# Patient Record
Sex: FEMALE | URBAN - METROPOLITAN AREA
[De-identification: names, ages, dates, MRNs, and addresses within clinical notes are randomized per-mention and may not be internally consistent; named-entity substitution may affect disease eponyms.]

---

## 2017-08-07 ENCOUNTER — NEW PATIENT (OUTPATIENT)
Dept: URBAN - METROPOLITAN AREA CLINIC 27 | Facility: CLINIC | Age: 62
End: 2017-08-07

## 2017-08-07 DIAGNOSIS — H31.103: ICD-10-CM

## 2017-08-07 PROCEDURE — 92225 OPHTHALMOSCOPY (INITIAL): CPT

## 2017-08-07 PROCEDURE — 99243 OFF/OP CNSLTJ NEW/EST LOW 30: CPT

## 2017-08-07 ASSESSMENT — VISUAL ACUITY
OS_SC: 20/20
OD_SC: 20/20

## 2017-08-07 ASSESSMENT — TONOMETRY
OS_IOP_MMHG: 17
OD_IOP_MMHG: 17

## 2021-04-06 DIAGNOSIS — Z23 ENCOUNTER FOR IMMUNIZATION: ICD-10-CM

## 2021-11-14 ENCOUNTER — HOSPITAL ENCOUNTER (EMERGENCY)
Facility: HOSPITAL | Age: 66
Discharge: HOME | End: 2021-11-14
Attending: EMERGENCY MEDICINE | Admitting: EMERGENCY MEDICINE
Payer: COMMERCIAL

## 2021-11-14 ENCOUNTER — APPOINTMENT (EMERGENCY)
Dept: RADIOLOGY | Facility: HOSPITAL | Age: 66
End: 2021-11-14
Payer: COMMERCIAL

## 2021-11-14 VITALS
DIASTOLIC BLOOD PRESSURE: 79 MMHG | OXYGEN SATURATION: 100 % | HEART RATE: 91 BPM | TEMPERATURE: 97.8 F | BODY MASS INDEX: 29.16 KG/M2 | SYSTOLIC BLOOD PRESSURE: 127 MMHG | WEIGHT: 175 LBS | RESPIRATION RATE: 20 BRPM | HEIGHT: 65 IN

## 2021-11-14 DIAGNOSIS — M54.9 BACK PAIN: Primary | ICD-10-CM

## 2021-11-14 DIAGNOSIS — S20.219A RIB CONTUSION: ICD-10-CM

## 2021-11-14 PROCEDURE — 72100 X-RAY EXAM L-S SPINE 2/3 VWS: CPT

## 2021-11-14 PROCEDURE — 71101 X-RAY EXAM UNILAT RIBS/CHEST: CPT

## 2021-11-14 PROCEDURE — 72072 X-RAY EXAM THORAC SPINE 3VWS: CPT

## 2021-11-14 PROCEDURE — 99284 EMERGENCY DEPT VISIT MOD MDM: CPT

## 2021-11-14 PROCEDURE — 96374 THER/PROPH/DIAG INJ IV PUSH: CPT

## 2021-11-14 PROCEDURE — 99285 EMERGENCY DEPT VISIT HI MDM: CPT | Performed by: EMERGENCY MEDICINE

## 2021-11-14 RX ORDER — LIDOCAINE 50 MG/G
1 PATCH TOPICAL DAILY
Qty: 2 PATCH | Refills: 0 | Status: SHIPPED | OUTPATIENT
Start: 2021-11-14 | End: 2021-11-16

## 2021-11-14 RX ORDER — CYCLOBENZAPRINE HCL 10 MG
10 TABLET ORAL 2 TIMES DAILY PRN
Qty: 10 TABLET | Refills: 0 | Status: SHIPPED | OUTPATIENT
Start: 2021-11-14 | End: 2021-11-19

## 2021-11-14 RX ORDER — HYDROMORPHONE HCL/PF 1 MG/ML
1 SYRINGE (ML) INJECTION ONCE
Status: COMPLETED | OUTPATIENT
Start: 2021-11-14 | End: 2021-11-14

## 2021-11-14 RX ORDER — PREDNISONE 20 MG/1
60 TABLET ORAL DAILY
Qty: 15 TABLET | Refills: 0 | Status: SHIPPED | OUTPATIENT
Start: 2021-11-14 | End: 2021-11-19

## 2021-11-14 RX ADMIN — HYDROMORPHONE HYDROCHLORIDE 1 MG: 1 INJECTION, SOLUTION INTRAMUSCULAR; INTRAVENOUS; SUBCUTANEOUS at 08:36

## 2025-02-19 ENCOUNTER — APPOINTMENT (OUTPATIENT)
Dept: RADIOLOGY | Facility: HOSPITAL | Age: 70
End: 2025-02-19
Payer: COMMERCIAL

## 2025-02-19 ENCOUNTER — HOSPITAL ENCOUNTER (EMERGENCY)
Facility: HOSPITAL | Age: 70
Discharge: LEFT BEFORE DISCHARGE | End: 2025-02-19
Attending: STUDENT IN AN ORGANIZED HEALTH CARE EDUCATION/TRAINING PROGRAM | Admitting: STUDENT IN AN ORGANIZED HEALTH CARE EDUCATION/TRAINING PROGRAM
Payer: COMMERCIAL

## 2025-02-19 ENCOUNTER — APPOINTMENT (EMERGENCY)
Dept: RADIOLOGY | Facility: HOSPITAL | Age: 70
End: 2025-02-19
Payer: COMMERCIAL

## 2025-02-19 VITALS
WEIGHT: 171 LBS | OXYGEN SATURATION: 99 % | RESPIRATION RATE: 18 BRPM | TEMPERATURE: 97.5 F | HEART RATE: 115 BPM | DIASTOLIC BLOOD PRESSURE: 83 MMHG | HEIGHT: 65 IN | SYSTOLIC BLOOD PRESSURE: 160 MMHG | BODY MASS INDEX: 28.49 KG/M2

## 2025-02-19 DIAGNOSIS — M25.532 LEFT WRIST PAIN: ICD-10-CM
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
## 2025-02-19 PROCEDURE — 99284 EMERGENCY DEPT VISIT MOD MDM: CPT

## 2025-02-19 PROCEDURE — 99284 EMERGENCY DEPT VISIT MOD MDM: CPT | Performed by: STUDENT IN AN ORGANIZED HEALTH CARE EDUCATION/TRAINING PROGRAM

## 2025-02-19 PROCEDURE — 73110 X-RAY EXAM OF WRIST: CPT

## 2025-02-19 PROCEDURE — 29125 APPL SHORT ARM SPLINT STATIC: CPT | Performed by: STUDENT IN AN ORGANIZED HEALTH CARE EDUCATION/TRAINING PROGRAM

## 2025-02-19 RX ORDER — ACETAMINOPHEN 325 MG/1
650 TABLET ORAL ONCE
Status: COMPLETED | OUTPATIENT
Start: 2025-02-19 | End: 2025-02-19

## 2025-02-19 RX ADMIN — ACETAMINOPHEN 650 MG: 325 TABLET ORAL at 19:26

## 2025-02-20 NOTE — ED NOTES
Patient and visitor expressed dissatisfaction with the wait time that would be necessary in order to obtain a head CT as ordered by provider. Pt stating she does not want to wait and would like to leave AMA. Provider aware and to bedside.     Sharon Maxwell RN  02/19/25 2689

## 2025-02-20 NOTE — ED PROVIDER NOTES
"Time reflects when diagnosis was documented in both MDM as applicable and the Disposition within this note       Time User Action Codes Description Comment    2/19/2025  7:31 PM Terry Roque Add [W19.XXXA] Fall, initial encounter     2/19/2025  7:32 PM Terry Roque [M25.532] Left wrist pain           ED Disposition       ED Disposition   AMA    Condition   --    Date/Time   Wed Feb 19, 2025  7:31 PM    Comment   Date: 2/19/2025  Patient: Mary Jane Ellis  Admitted: 2/19/2025  6:53 PM  Attending Provider: Terry Roque DO    Mary Jane Ellis or her authorized caregiver has made the decision for the patient to leave the emergency department against the advice of h er attending physician. She or her authorized caregiver has been informed and understands the inherent risks, including death, intracranial bleeding, skull fracture, loss of limb, paralysis.  She or her authorized caregiver has decided to accept the  responsibility for this decision. Mary Jane Ellis and all necessary parties have been advised that she may return for further evaluation or treatment. Her condition at time of discharge was stable.  Mary Jane Ellis had current vital signs as follows:  BP 1 60/83   Pulse (!) 115   Temp 97.5 °F (36.4 °C)   Resp 18   Ht 5' 5\" (1.651 m)   Wt 77.6 kg (171 lb)                Assessment & Plan       Medical Decision Making  Patient is a 69 y.o. female who presents to the ED for left wrist pain after fall.  Patient is nontoxic, well-appearing.     Differential includes but is not limited to: Wrist fracture, contusion.  Low suspicion for intracranial injury.  Presentation not consistent with C-spine injury. however, unable to apply [place] CT given age, nexus given distracting injury.     Plan: X-rays, CT head, CT C-spine, reassess                   Risk  OTC drugs.        ED Course as of 02/20/25 1423   Wed Feb 19, 2025   1934 Pt revaluated.  Resting comfortably.  Does not want to undergo CT imaging.  Discussed " -- DO NOT REPLY / DO NOT REPLY ALL --  -- Message is from [facility] [employer] ([facility]) --    General Patient Message: Patient is calling in regards to his stomach pain and would like a sooner appointment with Dr Farooq     Callnilay Information       Type Contact Phone/Fax    07/06/2023 08:55 AM CDT Phone (Incoming) Lennox Bauman (Self) [phone] (M)    07/07/2023 09:44 AM CDT Phone (Outgoing) Lennox Bauman (Self) [phone] (M)    Spoke with Patient -  Schedule 8/3/23 1:50 p.m. RS    07/19/2023 04:10 PM CDT Phone (Incoming) Lennox Bauman (Self) [phone] (M)        Alternative phone number: NO    Can a detailed message be left? Yes    Message Turnaround: [place]:    Refer to site's KB page for routing instructions    Please give this turnaround time to the caller:   \"You can expect to receive a response 1-3 business days after your provider's clinical team reviews the message\"               risks of not doing so.  Explained this would be AGAINST MEDICAL ADVICE.  She verbalized these risks.  Discussed given her snuffbox tenderness will place in splint and have her follow-up with orthopedics.  Will discharge.   1935 The patient insists on leaving against medical advice, despite my recommendation to remain for ongoing treatment.    1: Capacity: I have determined that the patient has capacity to make the decision to leave against medical advice based on the following:    A. Ability to express a choice: The patient is able to express his or her choice and communicate that choice.   B. Ability to understand relevant information: The patient is able to verbalize their diagnosis, understand information about the purpose of treatment, remember the information, and show that he or she can be part of the decision-making process.    C. Ability to appreciate the significance of the information and its consequences: The patient understands the consequences of treatment refusal and the risks and benefits of accepting or refusing treatment.    D. Ability to manipulate information: The patient is able to engage in reasoning as it applies to making treatment decisions   2: Psychiatric Consultation: There is not an indication to call psychiatry consultation to determine capacity    3. Alternative Treatment: I have discussed the recommended course of treatment and available alternatives.   4. Risks: I have discussed the specific risks of that patient refusing treatment    5. Follow-up Care: I have discussed the follow-up care and advised to see patient's PCP immediately or return here for worsening.   6. ED Option: I have emphasized that the patient has the option to return to the ED. Reviewed that we can have continuation of the workup at any time and that we are always open.       Medications   acetaminophen (TYLENOL) tablet 650 mg (650 mg Oral Given 2/19/25 1926)       ED Risk Strat Scores                             SBIRT 22yo+      Flowsheet Row Most Recent Value   Initial Alcohol Screen: US AUDIT-C     1. How often do you have a drink containing alcohol? 0 Filed at: 02/19/2025 1833   2. How many drinks containing alcohol do you have on a typical day you are drinking?  0 Filed at: 02/19/2025 1833   3a. Male UNDER 65: How often do you have five or more drinks on one occasion? 0 Filed at: 02/19/2025 1833   3b. FEMALE Any Age, or MALE 65+: How often do you have 4 or more drinks on one occassion? 0 Filed at: 02/19/2025 1833   Audit-C Score 0 Filed at: 02/19/2025 1833   MARY: How many times in the past year have you...    Used an illegal drug or used a prescription medication for non-medical reasons? Never Filed at: 02/19/2025 1833                            History of Present Illness       Chief Complaint   Patient presents with    Fall     Fell while walking dog today and hurt left wrsit       Past Medical History:   Diagnosis Date    Arrhythmia     Hyperlipidemia     Uterine cancer (HCC)       History reviewed. No pertinent surgical history.   History reviewed. No pertinent family history.   Social History     Tobacco Use    Smoking status: Never    Smokeless tobacco: Never   Vaping Use    Vaping status: Never Used   Substance Use Topics    Alcohol use: Yes     Alcohol/week: 3.0 standard drinks of alcohol     Types: 3 Glasses of wine per week     Comment: every other day    Drug use: No      E-Cigarette/Vaping    E-Cigarette Use Never User       E-Cigarette/Vaping Substances    Nicotine No     THC No     CBD No     Flavoring No     Other No     Unknown No       I have reviewed and agree with the history as documented.     69-year-old female, past medical history including hyperlipidemia, who presents to the emergency department for left wrist pain.  Fell while walking her dog.  Fell backwards.  Injured her left wrist in the process.  No LOC.  Main complaint is left wrist pain.  Worse with certain  movements.  No other modifying factors.  No associated symptoms.  No neck pain.  No chest pain.  No AC/AP.  No other complaints or concerns.      Fall      Review of Systems   Musculoskeletal:         Left wrist pain     All other systems reviewed and are negative.          Objective       ED Triage Vitals   Temperature Pulse Blood Pressure Respirations SpO2 Patient Position - Orthostatic VS   02/19/25 1833 02/19/25 1837 02/19/25 1837 02/19/25 1833 02/19/25 1837 --   97.5 °F (36.4 °C) (!) 115 160/83 18 99 %       Temp src Heart Rate Source BP Location FiO2 (%) Pain Score    -- -- -- -- 02/19/25 1833        9      Vitals      Date and Time Temp Pulse SpO2 Resp BP Pain Score FACES Pain Rating User   02/19/25 1837 -- 115 99 % -- 160/83 -- -- KIA   02/19/25 1833 97.5 °F (36.4 °C) -- -- 18 -- 9 -- KIA            Physical Exam  Vitals and nursing note reviewed.   Constitutional:       General: She is not in acute distress.     Appearance: She is well-developed. She is not ill-appearing, toxic-appearing or diaphoretic.   HENT:      Head: Normocephalic and atraumatic.      Right Ear: External ear normal.      Left Ear: External ear normal.      Nose: Nose normal.   Eyes:      General: Lids are normal. No scleral icterus.  Cardiovascular:      Rate and Rhythm: Normal rate and regular rhythm.   Pulmonary:      Effort: Pulmonary effort is normal. No respiratory distress.   Musculoskeletal:         General: No deformity. Normal range of motion.        Hands:       Cervical back: Normal range of motion and neck supple.      Comments: Left distal radius tenderness, as well as left snuffbox tenderness.  No deformities.  Decreased range of motion of left wrist secondary to pain.  Full range of motion of all digits.  2+ radial pulse.  No skin color changes.  No crepitus.  Compartments are soft.   Skin:     General: Skin is warm and dry.   Neurological:      General: No focal deficit present.      Mental Status: She is alert.  "  Psychiatric:         Mood and Affect: Mood normal.         Behavior: Behavior normal.         Results Reviewed       None            XR wrist 3+ views LEFT   Final Interpretation by Gómez Nicholson MD (02/20 1127)      No acute osseous abnormality.         Computerized Assisted Algorithm (CAA) may have been used to analyze all applicable images.            Workstation performed: [id number]             Orthopedic injury treatment    Date/Time: 2/19/2025 7:30 PM    Performed by: Terry Roque DO  Authorized by: Terry Roque DO    Patient Location:  ED  [facility] Protocol:  procedure performed by consultantRisks and benefits: risks, benefits and alternatives were discussed  Consent given by: patient  Time out: Immediately prior to procedure a \"time out\" was called to verify the correct patient, procedure, equipment, support staff and site/side marked as required.  Timeout called at: 2/19/2025 7:30 PM.  Radiology Images displayed and confirmed. If images not available, report reviewed: imaging studies available  Required items: required blood products, implants, devices, and special equipment available  Patient identity confirmed: hospital-assigned identification number    Injury location:  Wrist  Location details:  Left wrist  Injury type:  Soft tissue  Neurovascular status: Neurovascularly intact    Distal perfusion: normal    Neurological function: normal    Range of motion: reduced    Local anesthesia used?: No    General anesthesia used?: No    Skeletal traction used?: No    Immobilization:  Splint  Splint type:  Thumb spica  Supplies used:  Ortho-Glass  Neurovascular status: Neurovascularly intact    Distal perfusion: normal    Neurological function: normal    Range of motion: unchanged    Patient tolerance:  Patient tolerated the procedure well with no immediate complications      ED Medication and Procedure Management   Prior to Admission Medications   Prescriptions Last Dose Informant Patient " Reported? Taking?   aspirin 81 MG tablet   Yes No   Sig: Take 81 mg by mouth daily.   Patient not taking: Reported on 11/14/2021    atorvastatin (LIPITOR) 40 mg tablet   Yes No   Sig: Take 40 mg by mouth daily.   cyclobenzaprine (FLEXERIL) 10 mg tablet   No No   Sig: Take 1 tablet (10 mg total) by mouth 2 (two) times a day as needed for muscle spasms for up to 5 days   diltiazem (CARDIZEM LA) 180 MG 24 hr tablet   Yes No   Sig: Take 180 mg by mouth daily.   lidocaine (LIDODERM) 5 %   No No   Sig: Apply 1 patch topically daily for 2 days Remove & Discard patch within 12 hours or as directed by MD      Facility-Administered Medications: None     Discharge Medication List as of 2/19/2025  7:34 PM        CONTINUE these medications which have NOT CHANGED    Details   aspirin 81 MG tablet Take 81 mg by mouth daily., Until Discontinued, Historical Med      atorvastatin (LIPITOR) 40 mg tablet Take 40 mg by mouth daily., Until Discontinued, Historical Med      cyclobenzaprine (FLEXERIL) 10 mg tablet Take 1 tablet (10 mg total) by mouth 2 (two) times a day as needed for muscle spasms for up to 5 days, Starting Sun 11/14/2021, Until Fri 11/19/2021 at 2359, Normal      diltiazem (CARDIZEM LA) 180 MG 24 hr tablet Take 180 mg by mouth daily., Until Discontinued, Historical Med      lidocaine (LIDODERM) 5 % Apply 1 patch topically daily for 2 days Remove & Discard patch within 12 hours or as directed by MD, Starting Sun 11/14/2021, Until Tue 11/16/2021, Normal             ED SEPSIS DOCUMENTATION   Time reflects when diagnosis was documented in both MDM as applicable and the Disposition within this note       Time User Action Codes Description Comment    2/19/2025  7:31 PM Terry Roque Add [W19.XXXA] Fall, initial encounter     2/19/2025  7:32 PM Terry Roque Add [M25.532] Left wrist pain                  Terry Roque, DO  02/20/25 1426

## 2025-02-20 NOTE — DISCHARGE INSTRUCTIONS
As discussed please follow up with orthopedics. Call to schedule an appointment. Continue to use tylenol as needed for pain. Return to the ED for any worsening pain, numbness, tingling, weakness, vomiting, or for any other new or concerning symptoms.

## 2025-02-21 ENCOUNTER — OFFICE VISIT (OUTPATIENT)
Dept: OBGYN CLINIC | Facility: CLINIC | Age: 70
End: 2025-02-21
Payer: COMMERCIAL
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
## 2025-02-21 DIAGNOSIS — S52.602A CLOSED FRACTURE DISTAL RADIUS AND ULNA, LEFT, INITIAL ENCOUNTER: ICD-10-CM

## 2025-02-21 DIAGNOSIS — S62.102A LEFT WRIST FRACTURE, CLOSED, INITIAL ENCOUNTER: Primary | ICD-10-CM

## 2025-02-21 DIAGNOSIS — S52.502A CLOSED FRACTURE DISTAL RADIUS AND ULNA, LEFT, INITIAL ENCOUNTER: ICD-10-CM

## 2025-02-21 DIAGNOSIS — M25.532 LEFT WRIST PAIN: ICD-10-CM

## 2025-02-21 PROCEDURE — 99204 OFFICE O/P NEW MOD 45 MIN: CPT | Performed by: STUDENT IN AN ORGANIZED HEALTH CARE EDUCATION/TRAINING PROGRAM

## 2025-02-21 NOTE — PROGRESS NOTES
ORTHOPAEDIC HAND, WRIST, AND ELBOW OFFICE  VISIT     ASSESSMENT/PLAN:    Mary Jane Ellis is a 69 y.o. RHD female who presents with a left wrist distal radius fracture DOI (2/19/25)    - Xrays demonstrate arthritis of the wrist, and possible non-displaced fracture at distal radius. Clinically patient has point tenderness over the distal radius with painful motion and therefore will treat as presumed fracture. Given stable alignment we have elected for period of immobilization in a removable universal wrist splint. She can remove for hygiene, otherwise should remain on at all time. No heavy lifting pushing or pulling during this 4 weeks.   -Continue using shoulder/ elbow/ finger joints, to prevent stiffness.   -RTO in 4 weeks with new xrays     The patient verbalized understanding of exam findings and treatment plan. We engaged in the shared decision-making process and treatment options were discussed at length with the patient. Surgical and conservative management discussed today along with risks and benefits.    Follow Up:  4 weeks   _________________________________________________________________________________________________________      CHIEF COMPLAINT:  Left wrist injury     SUBJECTIVE:  Mary Jane Ellis is a 69 y.o. female who presents with left dorsal wrist injury s/p falling while walking her dog on 2/19/25. She was seen in the ED that day and xray did not demonstrate a fracture, but she was placed in a splint and recommended to follow up in our office for further evaluation and treatment. She has been wearing the splint since placed on 2/19/25. She has not really been taking any pain medication. Her pain is minimal to moderate.     Handedness: right  Work status: N/A    I have personally reviewed all the relevant PMH, PSH, SH, FH, Medications and allergies      PAST MEDICAL HISTORY:  Past Medical History:   Diagnosis Date    Arrhythmia     Hyperlipidemia     Uterine cancer (HCC)        PAST SURGICAL  "HISTORY:  History reviewed. No pertinent surgical history.    FAMILY HISTORY:  History reviewed. No pertinent family history.    SOCIAL HISTORY:  Social History     Tobacco Use    Smoking status: Never    Smokeless tobacco: Never   Vaping Use    Vaping status: Never Used   Substance Use Topics    Alcohol use: Yes     Alcohol/week: 3.0 standard drinks of alcohol     Types: 3 Glasses of wine per week     Comment: every other day    Drug use: No       MEDICATIONS:    Current Outpatient Medications:     atorvastatin (LIPITOR) 40 mg tablet, Take 20 mg by mouth daily, Disp: , Rfl:     diltiazem (CARDIZEM LA) 180 MG 24 hr tablet, Take 180 mg by mouth daily., Disp: , Rfl:     aspirin 81 MG tablet, Take 81 mg by mouth daily. (Patient not taking: Reported on 11/14/2021), Disp: , Rfl:     cyclobenzaprine (FLEXERIL) 10 mg tablet, Take 1 tablet (10 mg total) by mouth 2 (two) times a day as needed for muscle spasms for up to 5 days, Disp: 10 tablet, Rfl: 0    lidocaine (LIDODERM) 5 %, Apply 1 patch topically daily for 2 days Remove & Discard patch within 12 hours or as directed by MD, Disp: 2 patch, Rfl: 0    ALLERGIES:  Allergies   Allergen Reactions    Penicillins            REVIEW OF SYSTEMS:  Pertinent items are noted in HPI.  A comprehensive review of systems was negative.    VITALS:  There were no vitals filed for this visit.    LABS:  HgA1c: No results found for: \"HGBA1C\"  BMP: No results found for: \"GLUCOSE\", \"CALCIUM\", \"NA\", \"K\", \"CO2\", \"CL\", \"BUN\", \"CREATININE\"    _____________________________________________________  PHYSICAL EXAMINATION:  General: well developed and well nourished, alert, oriented times 3, and appears comfortable  Psychiatric: Normal  HEENT: Normocephalic, Atraumatic Trachea Midline, No torticollis  Pulmonary: No audible wheezing or respiratory distress   Abdomen/GI: Non tender, non distended   Cardiovascular: Regular Rate and Rhythm. No pitting edema, 2+ radial pulse   Skin: No masses, erythema, " "lacerations, fluctation, ulcerations  Neurovascular: Sensation Intact to the Median, Ulnar, Radial Nerve, Motor Intact to the Median, Ulnar, Radial Nerve, and Pulses Intact  Musculoskeletal: Normal, except as noted in detailed exam and in HPI.        FOCUSED MUSCULOSKELETAL EXAMINATION:    Left Upper Extremity  Inspection: skin intact, no notable deformity. No ecchymoses/ erythema  Palpation: + ttp over radial dorsal left wrist.   Neurologic: 5/5 elbow flexion, 5/5 elbow extension, 4/5 wrist extension, 4/5 wrist flexion, 5/5 finger flexion, 5/5 finger extension, 5/5 FPL, 5/5 EPL, 5/5 APB, 5/5 intrinsics, sensation intact to median, radial, and ulnar nerve distributions  Vascular: Palpable radial pulse, brisk cap refill <2sec, hand warm and well perfused  MSK:   Left wrist +1 swelling. + ttp over left dorsal radial wrist and 1st CMC joint.   Decreased ROM on flexion secondary to weakness and pain.  ___________________________________________________  STUDIES REVIEWED:  Xrays of the left wrist obtained on 2/19/25 were independently reviewed which demonstrates non-displaced distal radius fracture      LABS REVIEWED:    HgA1c: No results found for: \"HGBA1C\"  BMP: No results found for: \"GLUCOSE\", \"CALCIUM\", \"NA\", \"K\", \"CO2\", \"CL\", \"BUN\", \"CREATININE\"    PROCEDURES PERFORMED:  Procedures      _____________________________________________________      Scribe Attestation      I,:  Helena Toro PA-C am acting as a scribe while in the presence of the attending physician.:       I,:  Marvel Hawthorne MD personally performed the services described in this documentation    as scribed in my presence.:               I agree with the history, physical examination, assessment and plan of care as documented above.    Marvel Hawthorne M.D.  Attending, Orthopaedic Surgery  Hand, Wrist, and Elbow Surgery  [facility]    "

## 2025-03-18 ENCOUNTER — OFFICE VISIT (OUTPATIENT)
Dept: OBGYN CLINIC | Facility: CLINIC | Age: 70
End: 2025-03-18
Payer: COMMERCIAL

## 2025-03-18 ENCOUNTER — APPOINTMENT (OUTPATIENT)
Dept: RADIOLOGY | Facility: CLINIC | Age: 70
End: 2025-03-18
Payer: COMMERCIAL
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
## 2025-03-18 DIAGNOSIS — S62.102D LEFT WRIST FRACTURE, WITH ROUTINE HEALING, SUBSEQUENT ENCOUNTER: ICD-10-CM

## 2025-03-18 PROBLEM — S52.602D CLOSED FRACTURE OF DISTAL ENDS OF LEFT RADIUS AND ULNA WITH ROUTINE HEALING: Status: RESOLVED | Noted: 2025-03-18 | Resolved: 2025-03-18

## 2025-03-18 PROBLEM — S52.602D CLOSED FRACTURE OF DISTAL ENDS OF LEFT RADIUS AND ULNA WITH ROUTINE HEALING: Status: ACTIVE | Noted: 2025-03-18

## 2025-03-18 PROBLEM — S52.502D CLOSED FRACTURE OF DISTAL ENDS OF LEFT RADIUS AND ULNA WITH ROUTINE HEALING: Status: RESOLVED | Noted: 2025-03-18 | Resolved: 2025-03-18

## 2025-03-18 PROBLEM — S52.502D CLOSED FRACTURE OF DISTAL ENDS OF LEFT RADIUS AND ULNA WITH ROUTINE HEALING: Status: ACTIVE | Noted: 2025-03-18

## 2025-03-18 PROCEDURE — 99213 OFFICE O/P EST LOW 20 MIN: CPT | Performed by: STUDENT IN AN ORGANIZED HEALTH CARE EDUCATION/TRAINING PROGRAM

## 2025-03-18 PROCEDURE — 73110 X-RAY EXAM OF WRIST: CPT

## 2025-03-18 NOTE — PROGRESS NOTES
ORTHOPAEDIC HAND, WRIST, AND ELBOW OFFICE  VISIT     ASSESSMENT/PLAN:    Mary Jane Ellis is a 69 y.o. RHD female who presents with a left wrist distal radius fracture DOI (2/19/25)  Xrays from today demonstrate stability of distal radius and evidence of interval healing of distal radius fx.   Brace is no longer necessary, unless for comfort   Begin ADL's and light weights. Gradually progress in her activities.   Return to the office on as needed basis.       The patient verbalized understanding of exam findings and treatment plan. We engaged in the shared decision-making process and treatment options were discussed at length with the patient. Surgical and conservative management discussed today along with risks and benefits.    Follow Up:    only as needed   _________________________________________________________________________________________________      CHIEF COMPLAINT:  Left wrist injury     SUBJECTIVE:  Mary Jane Ellis is a 69 y.o. female who presents today for a follow up for her left wrist injury sustained on 2/19/25. She was seen on 2/21/25 at which time was placed in a universal wrist splint for a presumed non-displaced distal radius fracture, with point tenderness. She was asked to return in 6 weeks with new xrays.     On discussion, she has been wearing her brace during the day, and taking it off at night. She has no pain and doesn't take any pain medications. She notices a huge improvement in pain and ROM.       I have personally reviewed all the relevant PMH, PSH, SH, FH, Medications and allergies      PAST MEDICAL HISTORY:  Past Medical History:   Diagnosis Date    Arrhythmia     Hyperlipidemia     Uterine cancer (HCC)        PAST SURGICAL HISTORY:  History reviewed. No pertinent surgical history.    FAMILY HISTORY:  History reviewed. No pertinent family history.    SOCIAL HISTORY:  Social History     Tobacco Use    Smoking status: Never    Smokeless tobacco: Never   Vaping Use    Vaping status: Never  "Used   Substance Use Topics    Alcohol use: Yes     Alcohol/week: 3.0 standard drinks of alcohol     Types: 3 Glasses of wine per week     Comment: every other day    Drug use: No       MEDICATIONS:    Current Outpatient Medications:     atorvastatin (LIPITOR) 40 mg tablet, Take 20 mg by mouth daily, Disp: , Rfl:     diltiazem (CARDIZEM LA) 180 MG 24 hr tablet, Take 180 mg by mouth daily., Disp: , Rfl:     aspirin 81 MG tablet, Take 81 mg by mouth daily. (Patient not taking: Reported on 11/14/2021), Disp: , Rfl:     cyclobenzaprine (FLEXERIL) 10 mg tablet, Take 1 tablet (10 mg total) by mouth 2 (two) times a day as needed for muscle spasms for up to 5 days, Disp: 10 tablet, Rfl: 0    lidocaine (LIDODERM) 5 %, Apply 1 patch topically daily for 2 days Remove & Discard patch within 12 hours or as directed by MD, Disp: 2 patch, Rfl: 0    ALLERGIES:  Allergies   Allergen Reactions    Penicillins            REVIEW OF SYSTEMS:  Pertinent items are noted in HPI.  A comprehensive review of systems was negative.    VITALS:  There were no vitals filed for this visit.    LABS:  HgA1c: No results found for: \"HGBA1C\"  BMP: No results found for: \"GLUCOSE\", \"CALCIUM\", \"NA\", \"K\", \"CO2\", \"CL\", \"BUN\", \"CREATININE\"    _____________________________________________________  PHYSICAL EXAMINATION:  General: well developed and well nourished, alert, oriented times 3, and appears comfortable  Psychiatric: Normal  HEENT: Normocephalic, Atraumatic Trachea Midline, No torticollis  Pulmonary: No audible wheezing or respiratory distress   Abdomen/GI: Non tender, non distended   Cardiovascular: Regular Rate and Rhythm. No pitting edema, 2+ radial pulse   Skin: No masses, erythema, lacerations, fluctation, ulcerations  Neurovascular: Sensation Intact to the Median, Ulnar, Radial Nerve, Motor Intact to the Median, Ulnar, Radial Nerve, and Pulses Intact  Musculoskeletal: Normal, except as noted in detailed exam and in HPI.        FOCUSED " "MUSCULOSKELETAL EXAMINATION:    Left Upper Extremity  Inspection: skin intact, no notable deformity. No ecchymoses/ erythema  Palpation: no ttp over left distal radius or ulna.   Neurologic: 5/5 elbow flexion, 5/5 elbow extension, 5/5 wrist extension, 5/5 wrist flexion, 5/5 finger flexion, 5/5 finger extension, 5/5 FPL, 5/5 EPL, 5/5 APB, 5/5 intrinsics, sensation intact to median, radial, and ulnar nerve distributions  Vascular: Palpable radial pulse, brisk cap refill <2sec, hand warm and well perfused  MSK:   Left wrist : no swelling, no ttp over distal radius or ulnar wrist. Full composite fist without pain. ROM adequate with flexion and extension. No point tenderness. __________________________________________________  STUDIES REVIEWED:  Xrays of the left wrist obtained on 2/19/25 were independently reviewed which demonstrates non-displaced distal radius fracture    X-rays from 3/18/25 revealed continued stability of her distal radius with evidence of interval healing.     LABS REVIEWED:    HgA1c: No results found for: \"HGBA1C\"  BMP: No results found for: \"GLUCOSE\", \"CALCIUM\", \"NA\", \"K\", \"CO2\", \"CL\", \"BUN\", \"CREATININE\"    PROCEDURES PERFORMED:  Procedures      _____________________________________________________      Scribe Attestation      I,:  Helena Toro PA-C am acting as a scribe while in the presence of the attending physician.:       I,:  Marvel Hawthorne MD personally performed the services described in this documentation    as scribed in my presence.:               I agree with the history, physical examination, assessment and plan of care as documented above.    Marvel Hawthorne M.D.  Attending, Orthopaedic Surgery  Hand, Wrist, and Elbow Surgery  [facility]    "

## 2025-07-14 ENCOUNTER — APPOINTMENT (EMERGENCY)
Dept: RADIOLOGY | Facility: HOSPITAL | Age: 70
End: 2025-07-14
Payer: COMMERCIAL

## 2025-07-14 ENCOUNTER — HOSPITAL ENCOUNTER (EMERGENCY)
Facility: HOSPITAL | Age: 70
Discharge: HOME | End: 2025-07-14
Attending: EMERGENCY MEDICINE
Payer: COMMERCIAL

## 2025-07-14 VITALS
OXYGEN SATURATION: 100 % | RESPIRATION RATE: 20 BRPM | BODY MASS INDEX: 27.79 KG/M2 | TEMPERATURE: 99.6 F | DIASTOLIC BLOOD PRESSURE: 98 MMHG | HEART RATE: 88 BPM | WEIGHT: 167 LBS | SYSTOLIC BLOOD PRESSURE: 141 MMHG
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
## 2025-07-14 LAB
ALBUMIN SERPL BCG-MCNC: 4.3 G/DL (ref 3.5–5)
ALP SERPL-CCNC: 81 U/L (ref 34–104)
ALT SERPL W P-5'-P-CCNC: 11 U/L (ref 7–52)
ANION GAP SERPL CALCULATED.3IONS-SCNC: 8 MMOL/L (ref 4–13)
AST SERPL W P-5'-P-CCNC: 13 U/L (ref 13–39)
BASOPHILS # BLD AUTO: 0.02 THOUSANDS/ÂΜL (ref 0–0.1)
BASOPHILS NFR BLD AUTO: 0 % (ref 0–1)
BILIRUB SERPL-MCNC: 0.6 MG/DL (ref 0.2–1)
BUN SERPL-MCNC: 15 MG/DL (ref 5–25)
CALCIUM SERPL-MCNC: 9.4 MG/DL (ref 8.4–10.2)
CHLORIDE SERPL-SCNC: 102 MMOL/L (ref 96–108)
CO2 SERPL-SCNC: 27 MMOL/L (ref 21–32)
CREAT SERPL-MCNC: 0.82 MG/DL (ref 0.6–1.3)
EOSINOPHIL # BLD AUTO: 0.07 THOUSAND/ÂΜL (ref 0–0.61)
EOSINOPHIL NFR BLD AUTO: 1 % (ref 0–6)
ERYTHROCYTE [DISTWIDTH] IN BLOOD BY AUTOMATED COUNT: 12.9 % (ref 11.6–15.1)
GFR SERPL CREATININE-BSD FRML MDRD: 73 ML/MIN/1.73SQ M
GLUCOSE SERPL-MCNC: 89 MG/DL (ref 65–140)
HCT VFR BLD AUTO: 42 % (ref 34.8–46.1)
HGB BLD-MCNC: 13.5 G/DL (ref 11.5–15.4)
IMM GRANULOCYTES # BLD AUTO: 0.03 THOUSAND/UL (ref 0–0.2)
IMM GRANULOCYTES NFR BLD AUTO: 0 % (ref 0–2)
LYMPHOCYTES # BLD AUTO: 1.93 THOUSANDS/ÂΜL (ref 0.6–4.47)
LYMPHOCYTES NFR BLD AUTO: 20 % (ref 14–44)
MCH RBC QN AUTO: 28.4 PG (ref 26.8–34.3)
MCHC RBC AUTO-ENTMCNC: 32.1 G/DL (ref 31.4–37.4)
MCV RBC AUTO: 88 FL (ref 82–98)
MONOCYTES # BLD AUTO: 0.84 THOUSAND/ÂΜL (ref 0.17–1.22)
MONOCYTES NFR BLD AUTO: 9 % (ref 4–12)
NEUTROPHILS # BLD AUTO: 6.67 THOUSANDS/ÂΜL (ref 1.85–7.62)
NEUTS SEG NFR BLD AUTO: 70 % (ref 43–75)
NRBC BLD AUTO-RTO: 0 /100 WBCS
PLATELET # BLD AUTO: 268 THOUSANDS/UL (ref 149–390)
PMV BLD AUTO: 9.3 FL (ref 8.9–12.7)
POTASSIUM SERPL-SCNC: 3.9 MMOL/L (ref 3.5–5.3)
PROT SERPL-MCNC: 7.6 G/DL (ref 6.4–8.4)
RBC # BLD AUTO: 4.76 MILLION/UL (ref 3.81–5.12)
SODIUM SERPL-SCNC: 137 MMOL/L (ref 135–147)
WBC # BLD AUTO: 9.56 THOUSAND/UL (ref 4.31–10.16)

## 2025-07-14 PROCEDURE — 99283 EMERGENCY DEPT VISIT LOW MDM: CPT

## 2025-07-14 PROCEDURE — 80053 COMPREHEN METABOLIC PANEL: CPT | Performed by: EMERGENCY MEDICINE

## 2025-07-14 PROCEDURE — 36415 COLL VENOUS BLD VENIPUNCTURE: CPT | Performed by: EMERGENCY MEDICINE

## 2025-07-14 PROCEDURE — 73630 X-RAY EXAM OF FOOT: CPT

## 2025-07-14 PROCEDURE — 85025 COMPLETE CBC W/AUTO DIFF WBC: CPT | Performed by: EMERGENCY MEDICINE

## 2025-07-14 PROCEDURE — 99284 EMERGENCY DEPT VISIT MOD MDM: CPT | Performed by: EMERGENCY MEDICINE

## 2025-07-14 RX ORDER — CLINDAMYCIN HYDROCHLORIDE 150 MG/1
150 CAPSULE ORAL EVERY 8 HOURS SCHEDULED
Qty: 21 CAPSULE | Refills: 0 | Status: SHIPPED | OUTPATIENT
Start: 2025-07-14 | End: 2025-07-21

## 2025-07-14 RX ORDER — TIRZEPATIDE 2.5 MG/.5ML
2.5 INJECTION, SOLUTION SUBCUTANEOUS WEEKLY
COMMUNITY

## 2025-07-14 RX ORDER — SULFAMETHOXAZOLE AND TRIMETHOPRIM 800; 160 MG/1; MG/1
1 TABLET ORAL 2 TIMES DAILY
Qty: 14 TABLET | Refills: 0 | Status: SHIPPED | OUTPATIENT
Start: 2025-07-14 | End: 2025-07-21

## 2025-07-14 RX ORDER — SULFAMETHOXAZOLE AND TRIMETHOPRIM 800; 160 MG/1; MG/1
1 TABLET ORAL ONCE
Status: COMPLETED | OUTPATIENT
Start: 2025-07-14 | End: 2025-07-14

## 2025-07-14 RX ORDER — CLINDAMYCIN HYDROCHLORIDE 150 MG/1
300 CAPSULE ORAL ONCE
Status: COMPLETED | OUTPATIENT
Start: 2025-07-14 | End: 2025-07-14

## 2025-07-14 RX ADMIN — CLINDAMYCIN HYDROCHLORIDE 300 MG: 150 CAPSULE ORAL at 18:32

## 2025-07-14 RX ADMIN — SULFAMETHOXAZOLE AND TRIMETHOPRIM 1 TABLET: 800; 160 TABLET ORAL at 17:57

## 2025-07-14 NOTE — Clinical Note
Mary Jane Ellis was seen and treated in our emergency department on 7/14/2025.                Diagnosis:     Mary Jane  may return to work on return date.    She may return on this date: 07/16/2025         If you have any questions or concerns, please don't hesitate to call.      Vitaly Sharpe, DO    ______________________________           _______________          _______________  Hospital Representative                              Date                                Time

## 2025-07-14 NOTE — ED NOTES
Patient left before medication administration and discharge instructions. Patient called and reports it was a misunderstanding. Patient return for medication and instructions.      Lizzette Pabon RN  07/14/25 [id number]